# Patient Record
(demographics unavailable — no encounter records)

---

## 2025-01-10 NOTE — DISCUSSION/SUMMARY
Continued Stay SW/CM Assessment/Plan of Care Note   Progress note:  CM/SW following for DC planning  - Patient is accepted for  service with [facility] - CM to inform RN during DC huddle  - CM spoke to Plastics resident. Patient to have wound vac changed today.  - [facility] is [facility] today with wound vac change today. Per plastics patient will follow up in office on Thursday.   - CM communicated to Alejandra that first wound vac change from  agency will be needed on 4-17-23    See SW/CM flowsheets for other objective data.    Disposition Recommendations:  Preliminary discharge destination:    SW/CM recommendation for discharge: Home care    Discharge Plan/Needs:     Continued Care and Services - Admitted Since 4/7/2023     Home Medical Care Coordination complete.    Service Provider Request Status Selected Services Address Phone Fax    [facility] [address] [place] [phone] -- --                  Devices/ Equipment that need to be arranged for discharge:     Accepted   Pending insurance authorization   Others:    Anticipated date of DME availability:     Prior To Hospitalization:    Living Situation: Spouse and residing at House.    Support Systems: Family members, Spouse   Home Devices/Equipment: None   Mobility Assist Devices: Wheelchair, Standard walker   Type of Service Prior to Hospitalization: None     Patient/Family discharge goal (s):  Home Care     Resources provided:           Therapy Recommendations for Discharge:   PT:   Recommendation for Discharge: PT IL: Patient requires 24 HOUR assistance to perform mobility and/or ADLs safely, Patient does not need Physical Therapy (initial 24 hr assist to maintain NWB status)  OT:       SLP:      Mobility Equipment Recommended for Discharge: none (pt owns w/c, RW, clint). possible medcar/lift assist to enter home      Barriers to  Discharge  Identified Barriers to Discharge/Transition Planning:      Medical necessity for acute care             [FreeTextEntry1] : Abdominal pain.  Antacid given in health clinic.   Health counseling reviewed brat diet and increase oral hydration.  RTC as needed.

## 2025-01-10 NOTE — PHYSICAL EXAM
[General Appearance - Well Developed] : interactive [Appearance Of Head] : the head was normocephalic [Evidence Of Head Injury] : threre was no evidence of injury [Sclera] : the sclera and conjunctiva were normal [PERRL With Normal Accommodation] : pupils were equal in size, round, reactive to light, with normal accommodation [Outer Ear] : the ears and nose were normal in appearance [] : no respiratory distress [Bowel Sounds] : normal bowel sounds [Abdomen Soft] : soft [Epigastric] : in the epigastric area [Periumbilical] : not in the periumbilical area [Suprapubic] : not in the suprapubic area [Abnormal Walk] : normal gait [Nail Clubbing] : no clubbing  or cyanosis of the fingernails [Skin Color & Pigmentation] : normal skin color and pigmentation [Skin Turgor] : normal skin turgor [Initial Inspection: Infant Active And Alert] : active and alert [Demonstrated Behavior - Infant Nonreactive To Parents] : interactive

## 2025-01-10 NOTE — HISTORY OF PRESENT ILLNESS
[Abdominal Pain] : abdominal pain [FreeTextEntry6] : Pt in health clinic for abdominal pain x 1 day. Pt ate pizza for breakfast which is usual. Pt co nausea, denies vomiting or diarrhea.